# Patient Record
Sex: FEMALE | Race: WHITE | Employment: OTHER | URBAN - METROPOLITAN AREA
[De-identification: names, ages, dates, MRNs, and addresses within clinical notes are randomized per-mention and may not be internally consistent; named-entity substitution may affect disease eponyms.]

---

## 2017-02-06 PROCEDURE — 87081 CULTURE SCREEN ONLY: CPT | Performed by: INTERNAL MEDICINE

## 2017-02-06 PROCEDURE — 36415 COLL VENOUS BLD VENIPUNCTURE: CPT | Performed by: INTERNAL MEDICINE

## 2017-02-06 PROCEDURE — 85025 COMPLETE CBC W/AUTO DIFF WBC: CPT | Performed by: INTERNAL MEDICINE

## 2017-02-06 PROCEDURE — 87086 URINE CULTURE/COLONY COUNT: CPT | Performed by: INTERNAL MEDICINE

## 2017-02-06 PROCEDURE — 81001 URINALYSIS AUTO W/SCOPE: CPT | Performed by: INTERNAL MEDICINE

## 2017-02-06 PROCEDURE — 80053 COMPREHEN METABOLIC PANEL: CPT | Performed by: INTERNAL MEDICINE

## 2017-04-24 PROBLEM — I10 ESSENTIAL HYPERTENSION: Status: ACTIVE | Noted: 2017-04-24

## 2017-06-19 PROCEDURE — 82565 ASSAY OF CREATININE: CPT | Performed by: INTERNAL MEDICINE

## 2017-06-19 PROCEDURE — 82575 CREATININE CLEARANCE TEST: CPT | Performed by: INTERNAL MEDICINE

## 2017-06-19 PROCEDURE — 36415 COLL VENOUS BLD VENIPUNCTURE: CPT | Performed by: INTERNAL MEDICINE

## 2017-06-19 PROCEDURE — 84156 ASSAY OF PROTEIN URINE: CPT | Performed by: INTERNAL MEDICINE

## 2017-06-30 PROCEDURE — 87086 URINE CULTURE/COLONY COUNT: CPT | Performed by: INTERNAL MEDICINE

## 2025-01-06 ENCOUNTER — TELEPHONE (OUTPATIENT)
Facility: CLINIC | Age: 60
End: 2025-01-06

## 2025-01-06 DIAGNOSIS — M54.50 LOW BACK PAIN, UNSPECIFIED BACK PAIN LATERALITY, UNSPECIFIED CHRONICITY, UNSPECIFIED WHETHER SCIATICA PRESENT: Primary | ICD-10-CM

## 2025-01-06 NOTE — TELEPHONE ENCOUNTER
Patient scheduled for lower back pain.    Future Appointments   Date Time Provider Department Center   1/9/2025  8:40 AM Minh Malin MD [facility]     Please advise if imaging is needed.

## 2025-01-06 NOTE — TELEPHONE ENCOUNTER
Scheduled radiographs, sent patient message informing to arrive 15 min early.   Future Appointments   Date Time Provider Department Center   1/9/2025  8:10 AM [facility] [place]   1/9/2025  8:40 AM Minh Malin MD [facility]

## 2025-01-09 ENCOUNTER — OFFICE VISIT (OUTPATIENT)
Dept: ORTHOPEDICS CLINIC | Facility: CLINIC | Age: 60
End: 2025-01-09
Payer: MEDICARE

## 2025-01-09 ENCOUNTER — HOSPITAL ENCOUNTER (OUTPATIENT)
Dept: GENERAL RADIOLOGY | Age: 60
Discharge: HOME | End: 2025-01-09
Attending: STUDENT IN AN ORGANIZED HEALTH CARE EDUCATION/TRAINING PROGRAM
Payer: MEDICARE

## 2025-01-09 DIAGNOSIS — M54.50 LOW BACK PAIN, UNSPECIFIED BACK PAIN LATERALITY, UNSPECIFIED CHRONICITY, UNSPECIFIED WHETHER SCIATICA PRESENT: ICD-10-CM

## 2025-01-09 DIAGNOSIS — Z98.1 HISTORY OF LUMBAR SPINAL FUSION: Primary | ICD-10-CM

## 2025-01-09 PROCEDURE — 72100 X-RAY EXAM L-S SPINE 2/3 VWS: CPT | Performed by: STUDENT IN AN ORGANIZED HEALTH CARE EDUCATION/TRAINING PROGRAM

## 2025-01-09 PROCEDURE — 99205 OFFICE O/P NEW HI 60 MIN: CPT | Performed by: STUDENT IN AN ORGANIZED HEALTH CARE EDUCATION/TRAINING PROGRAM

## 2025-01-09 NOTE — H&P
[facility] - ORTHOPEDICS  [address], [address], [place] [address]  1950 [address] [place] [id number]  [phone]     NEW PATIENT VISIT - HISTORY AND PHYSICAL EXAMINATION     Name: Alena Mendes   MRN: [id number]  Date: 01/09/25       CC: Back pain    REFERRED BY: YVETTE PEREZ MD    HPI:   Alena Mendes is a very pleasant 59 year old female who presents today for evaluation of back pain. The distribution of symptoms are: 100% backpain and 0% leg pain. The symptoms began many year(s) ago without any significant injury. Since the onset, the symptoms have become slowly worse over time. Patient feels pain is aggravated by activity and improved by rest. The patient reports no numbness and no weakness.  The symptom characteristics are as follows: Patient is a 59-year-old female with history of multiple lumbar surgeries resulting in L2 to L5 interbody fusion.  Patient had predominantly back pain preoperatively.  Symptoms improved temporarily, but patient has had recurrence of severe low back pain.  Patient has been managed by pain clinic.    Prior spine surgery: L2-L5 interbody fusion.    Bowel and bladder symptoms: absent.    The patient has not had issues with balance and/or hand dexterity problems such as changes in penmanship or the use of buttons or zippers.    Treatment up to this time has included:    Evaluation: other spine surgeon  NSAIDS: have been partially helpful  Narcotic use: None  Physical therapy: previously  Spinal injections: multiple  Others:       PMH:   Past Medical History:    Abnormal involuntary movement    ADD (attention deficit disorder)    ADHD (attention deficit hyperactivity disorder)    Allergic rhinitis    Anxiety    Chronic ischemic heart disease    COPD (chronic obstructive pulmonary disease) (HCC)    Depression    DJD (degenerative joint disease)    Esophageal reflux    Essential hypertension    Fatigue    Hyperlipidemia     Hypothyroidism    Lumbago    Malaise and fatigue    Manic depression (HCC)    Migraine    Noninfectious gastroenteritis and colitis    Osteoarthritis    RA (rheumatoid arthritis) (HCC)    RLS (restless legs syndrome)    Systemic lupus erythematosus (HCC)    Urinary incontinence       PAST SURGICAL HX:  Past Surgical History:   Procedure Laterality Date    Cholecystectomy      D & c      Hysterectomy      Knee replacement surgery      right 3/2/2017    Other surgical history      JAW SX    Other surgical history      Arthroscopic on right knee surgery     Tubal ligation         FAMILY HX:  Family History   Problem Relation Age of Onset    Hypertension Father     Cancer Mother         BREAST & ESOPHAGEAL CANCER    Cancer Sister         CERVICAL & INTESTINAL CANCER       ALLERGIES:  Codeine    MEDICATIONS:   Current Outpatient Medications   Medication Sig Dispense Refill    atorvastatin 20 MG Oral Tab TAKE ONE TABLET BY MOUTH EVERY NIGHT AT BEDTIME 90 tablet 0    Omeprazole 40 MG Oral Capsule Delayed Release TAKE ONE CAPSULE BY MOUTH TWO TIMES A DAY 30-60 MINUTES BEFORE MEALS 180 capsule 0    Potassium Chloride ER (KLOR-CON M20) 20 MEQ Oral Tab CR Klor Con 20 meq Caplets 1  tablet 0    aspirin (ECOTRIN) 325 MG Oral Tab EC Take 325 mg by mouth daily.      lisinopril 10 MG Oral Tab Take 1 tablet (10 mg total) by mouth daily. 90 tablet 3    FUROSEMIDE 40 MG Oral Tab TAKE ONE TABLET BY MOUTH TWO TIMES A  tablet 3    lamoTRIgine (LAMICTAL) 150 MG Oral Tab Take 1 tablet by mouth 2 (two) times daily.      LORazepam 1 MG Oral Tab Take 1 tablet by mouth 3 (three) times daily.  1    BuPROPion HCl ER, SR, 200 MG Oral Tablet 12 Hr TK 1 T PO ONCE D  1    Hydroxychloroquine Sulfate 200 MG Oral Tab Take 200 mg by mouth 2 (two) times daily.  0    QUEtiapine Fumarate 100 MG Oral Tab TK 1 T PO HS  1    SUCRALFATE 1 G Oral Tab TAKE ONE TABLET BY MOUTH BEFORE MEALS AND AT BEDTIME 360 tablet 3       ROS: A comprehensive 14  point review of systems was performed and was negative aside from the aforementioned per history of present illness.    SOCIAL HX:  Social History     Tobacco Use    Smoking status: Former    Smokeless tobacco: Never   Substance Use Topics    Alcohol use: No     Alcohol/week: 0.0 standard drinks of alcohol         PE:   There were no vitals filed for this visit.  Estimated body mass index is 34.72 kg/m² as calculated from the following:    Height as of 7/6/17: 5' 10\" (1.778 m).    Weight as of 7/6/17: 242 lb (109.8 kg).    Physical Exam  Constitutional:       Appearance: Normal appearance.   HENT:      Head: Normocephalic and atraumatic.   Eyes:      Extraocular Movements: Extraocular movements intact.   Cardiovascular:      Pulses: Normal pulses. Skin warm and well perfused.  Pulmonary:      Effort: Pulmonary effort is normal. No respiratory distress.   Skin:     General: Skin is warm.   Psychiatric:         Mood and Affect: Mood normal.     Spine Exam:    Normal gait without difficulty  Able to heel, toe, tandem gait without difficulty  Level shoulders and hips in even stance    Restricted L-spine ROM    No tenderness to palpation of L-spine    Straight leg raise test: negative    Sustained clonus: negative    LE Strength: 5/5 IP QUAD TA EHL GSC  LE Sensation: normal in L2-S1 distribution  LE reflexes: normal    Radiographic Examination/Diagnostics:  XR and CT personally viewed, independently interpreted and radiology report was reviewed.  X-ray of the lumbar spine demonstrates L2-L5 interbody fusion no gross evidence of hardware failure.  CT does not reveal any lucency around hardware.  There is questionable hardware failure based on CT report, but I do not appreciate any gross evidence of nonunion or hardware failure.      IMPRESSION/PLAN: Alena Mendes is a 59 year old female with history of L2-L5 interbody fusion, presenting with predominantly axial back pain.  Pain is diffuse across the low back.  No  evidence of gross hardware failure or nonunion on imaging.  Patient has degenerative disk disease L5-S1 and L1-L2.  I discussed with patient management options.  Exploration of fusion and extension fusion will not reliably provide long-term relief of back pain.  Recommend continued medical management. Patient is scheduled to see Dr. Sheehan for possible WINNIE, SI joint injection and possible spinal cord stimulator.    Minh Malin MD  Orthopedic Spine Surgeon  [facility]   [address] [place] [address].[facility]  [email or web address]  t: [phone]   f: [phone]        This note was dictated using Dragon software.  While it was briefly proofread prior to completion, some grammatical, spelling, and word choice errors due to dictation may still occur.